# Patient Record
Sex: FEMALE | Race: BLACK OR AFRICAN AMERICAN | NOT HISPANIC OR LATINO | Employment: PART TIME | URBAN - METROPOLITAN AREA
[De-identification: names, ages, dates, MRNs, and addresses within clinical notes are randomized per-mention and may not be internally consistent; named-entity substitution may affect disease eponyms.]

---

## 2022-12-07 ENCOUNTER — PRE-ADMISSION TESTING (OUTPATIENT)
Dept: ADMISSIONS | Facility: MEDICAL CENTER | Age: 22
End: 2022-12-07
Attending: SPECIALIST
Payer: COMMERCIAL

## 2022-12-07 RX ORDER — ALPRAZOLAM 0.5 MG/1
0.5 TABLET ORAL NIGHTLY PRN
COMMUNITY

## 2022-12-08 ENCOUNTER — ANESTHESIA EVENT (OUTPATIENT)
Dept: SURGERY | Facility: MEDICAL CENTER | Age: 22
End: 2022-12-08
Payer: COMMERCIAL

## 2022-12-09 ENCOUNTER — ANESTHESIA (OUTPATIENT)
Dept: SURGERY | Facility: MEDICAL CENTER | Age: 22
End: 2022-12-09
Payer: COMMERCIAL

## 2022-12-09 ENCOUNTER — HOSPITAL ENCOUNTER (OUTPATIENT)
Facility: MEDICAL CENTER | Age: 22
End: 2022-12-09
Attending: SPECIALIST | Admitting: SPECIALIST
Payer: COMMERCIAL

## 2022-12-09 VITALS
TEMPERATURE: 98.1 F | HEART RATE: 77 BPM | SYSTOLIC BLOOD PRESSURE: 135 MMHG | RESPIRATION RATE: 20 BRPM | OXYGEN SATURATION: 96 % | WEIGHT: 247.58 LBS | DIASTOLIC BLOOD PRESSURE: 69 MMHG | BODY MASS INDEX: 36.67 KG/M2 | HEIGHT: 69 IN

## 2022-12-09 DIAGNOSIS — G89.18 POST-OP PAIN: ICD-10-CM

## 2022-12-09 LAB — HCG SERPL QL: NEGATIVE

## 2022-12-09 PROCEDURE — 00840 ANES IPER PX LOWER ABD NOS: CPT | Performed by: STUDENT IN AN ORGANIZED HEALTH CARE EDUCATION/TRAINING PROGRAM

## 2022-12-09 PROCEDURE — 700102 HCHG RX REV CODE 250 W/ 637 OVERRIDE(OP): Performed by: STUDENT IN AN ORGANIZED HEALTH CARE EDUCATION/TRAINING PROGRAM

## 2022-12-09 PROCEDURE — 160035 HCHG PACU - 1ST 60 MINS PHASE I: Performed by: SPECIALIST

## 2022-12-09 PROCEDURE — 160039 HCHG SURGERY MINUTES - EA ADDL 1 MIN LEVEL 3: Performed by: SPECIALIST

## 2022-12-09 PROCEDURE — 700105 HCHG RX REV CODE 258: Performed by: SPECIALIST

## 2022-12-09 PROCEDURE — 700111 HCHG RX REV CODE 636 W/ 250 OVERRIDE (IP): Performed by: STUDENT IN AN ORGANIZED HEALTH CARE EDUCATION/TRAINING PROGRAM

## 2022-12-09 PROCEDURE — 700111 HCHG RX REV CODE 636 W/ 250 OVERRIDE (IP): Performed by: SPECIALIST

## 2022-12-09 PROCEDURE — 160046 HCHG PACU - 1ST 60 MINS PHASE II: Performed by: SPECIALIST

## 2022-12-09 PROCEDURE — 700101 HCHG RX REV CODE 250: Performed by: STUDENT IN AN ORGANIZED HEALTH CARE EDUCATION/TRAINING PROGRAM

## 2022-12-09 PROCEDURE — 160002 HCHG RECOVERY MINUTES (STAT): Performed by: SPECIALIST

## 2022-12-09 PROCEDURE — 84703 CHORIONIC GONADOTROPIN ASSAY: CPT

## 2022-12-09 PROCEDURE — 36415 COLL VENOUS BLD VENIPUNCTURE: CPT

## 2022-12-09 PROCEDURE — A9270 NON-COVERED ITEM OR SERVICE: HCPCS | Performed by: STUDENT IN AN ORGANIZED HEALTH CARE EDUCATION/TRAINING PROGRAM

## 2022-12-09 PROCEDURE — 160009 HCHG ANES TIME/MIN: Performed by: SPECIALIST

## 2022-12-09 PROCEDURE — 93005 ELECTROCARDIOGRAM TRACING: CPT | Performed by: SPECIALIST

## 2022-12-09 PROCEDURE — 160048 HCHG OR STATISTICAL LEVEL 1-5: Performed by: SPECIALIST

## 2022-12-09 PROCEDURE — 88307 TISSUE EXAM BY PATHOLOGIST: CPT

## 2022-12-09 PROCEDURE — 160025 RECOVERY II MINUTES (STATS): Performed by: SPECIALIST

## 2022-12-09 PROCEDURE — 160028 HCHG SURGERY MINUTES - 1ST 30 MINS LEVEL 3: Performed by: SPECIALIST

## 2022-12-09 RX ORDER — SODIUM CHLORIDE, SODIUM LACTATE, POTASSIUM CHLORIDE, CALCIUM CHLORIDE 600; 310; 30; 20 MG/100ML; MG/100ML; MG/100ML; MG/100ML
INJECTION, SOLUTION INTRAVENOUS CONTINUOUS
Status: DISCONTINUED | OUTPATIENT
Start: 2022-12-09 | End: 2022-12-09 | Stop reason: HOSPADM

## 2022-12-09 RX ORDER — DEXAMETHASONE SODIUM PHOSPHATE 4 MG/ML
INJECTION, SOLUTION INTRA-ARTICULAR; INTRALESIONAL; INTRAMUSCULAR; INTRAVENOUS; SOFT TISSUE PRN
Status: DISCONTINUED | OUTPATIENT
Start: 2022-12-09 | End: 2022-12-09 | Stop reason: SURG

## 2022-12-09 RX ORDER — HYDROMORPHONE HYDROCHLORIDE 1 MG/ML
0.2 INJECTION, SOLUTION INTRAMUSCULAR; INTRAVENOUS; SUBCUTANEOUS
Status: DISCONTINUED | OUTPATIENT
Start: 2022-12-09 | End: 2022-12-09 | Stop reason: HOSPADM

## 2022-12-09 RX ORDER — MIDAZOLAM HYDROCHLORIDE 1 MG/ML
INJECTION INTRAMUSCULAR; INTRAVENOUS PRN
Status: DISCONTINUED | OUTPATIENT
Start: 2022-12-09 | End: 2022-12-09 | Stop reason: SURG

## 2022-12-09 RX ORDER — LIDOCAINE HYDROCHLORIDE 20 MG/ML
INJECTION, SOLUTION EPIDURAL; INFILTRATION; INTRACAUDAL; PERINEURAL PRN
Status: DISCONTINUED | OUTPATIENT
Start: 2022-12-09 | End: 2022-12-09 | Stop reason: SURG

## 2022-12-09 RX ORDER — ROCURONIUM BROMIDE 10 MG/ML
INJECTION, SOLUTION INTRAVENOUS PRN
Status: DISCONTINUED | OUTPATIENT
Start: 2022-12-09 | End: 2022-12-09 | Stop reason: SURG

## 2022-12-09 RX ORDER — HALOPERIDOL 5 MG/ML
1 INJECTION INTRAMUSCULAR
Status: DISCONTINUED | OUTPATIENT
Start: 2022-12-09 | End: 2022-12-09 | Stop reason: HOSPADM

## 2022-12-09 RX ORDER — CEFAZOLIN SODIUM 1 G/3ML
INJECTION, POWDER, FOR SOLUTION INTRAMUSCULAR; INTRAVENOUS PRN
Status: DISCONTINUED | OUTPATIENT
Start: 2022-12-09 | End: 2022-12-09 | Stop reason: SURG

## 2022-12-09 RX ORDER — OXYCODONE HCL 5 MG/5 ML
10 SOLUTION, ORAL ORAL
Status: COMPLETED | OUTPATIENT
Start: 2022-12-09 | End: 2022-12-09

## 2022-12-09 RX ORDER — IBUPROFEN 800 MG/1
800 TABLET ORAL EVERY 8 HOURS PRN
Qty: 30 TABLET | Refills: 0 | Status: SHIPPED | OUTPATIENT
Start: 2022-12-09

## 2022-12-09 RX ORDER — DIPHENHYDRAMINE HYDROCHLORIDE 50 MG/ML
12.5 INJECTION INTRAMUSCULAR; INTRAVENOUS
Status: DISCONTINUED | OUTPATIENT
Start: 2022-12-09 | End: 2022-12-09 | Stop reason: HOSPADM

## 2022-12-09 RX ORDER — HYDROMORPHONE HYDROCHLORIDE 1 MG/ML
0.1 INJECTION, SOLUTION INTRAMUSCULAR; INTRAVENOUS; SUBCUTANEOUS
Status: DISCONTINUED | OUTPATIENT
Start: 2022-12-09 | End: 2022-12-09 | Stop reason: HOSPADM

## 2022-12-09 RX ORDER — HYDROMORPHONE HYDROCHLORIDE 2 MG/ML
INJECTION, SOLUTION INTRAMUSCULAR; INTRAVENOUS; SUBCUTANEOUS PRN
Status: DISCONTINUED | OUTPATIENT
Start: 2022-12-09 | End: 2022-12-09 | Stop reason: SURG

## 2022-12-09 RX ORDER — SODIUM CHLORIDE, SODIUM LACTATE, POTASSIUM CHLORIDE, CALCIUM CHLORIDE 600; 310; 30; 20 MG/100ML; MG/100ML; MG/100ML; MG/100ML
INJECTION, SOLUTION INTRAVENOUS CONTINUOUS
Status: ACTIVE | OUTPATIENT
Start: 2022-12-09 | End: 2022-12-09

## 2022-12-09 RX ORDER — HYDROMORPHONE HYDROCHLORIDE 1 MG/ML
0.4 INJECTION, SOLUTION INTRAMUSCULAR; INTRAVENOUS; SUBCUTANEOUS
Status: DISCONTINUED | OUTPATIENT
Start: 2022-12-09 | End: 2022-12-09 | Stop reason: HOSPADM

## 2022-12-09 RX ORDER — ONDANSETRON 2 MG/ML
4 INJECTION INTRAMUSCULAR; INTRAVENOUS
Status: DISCONTINUED | OUTPATIENT
Start: 2022-12-09 | End: 2022-12-09 | Stop reason: HOSPADM

## 2022-12-09 RX ORDER — MEPERIDINE HYDROCHLORIDE 25 MG/ML
12.5 INJECTION INTRAMUSCULAR; INTRAVENOUS; SUBCUTANEOUS
Status: DISCONTINUED | OUTPATIENT
Start: 2022-12-09 | End: 2022-12-09 | Stop reason: HOSPADM

## 2022-12-09 RX ORDER — OXYCODONE HCL 5 MG/5 ML
5 SOLUTION, ORAL ORAL
Status: COMPLETED | OUTPATIENT
Start: 2022-12-09 | End: 2022-12-09

## 2022-12-09 RX ORDER — OXYCODONE HYDROCHLORIDE AND ACETAMINOPHEN 5; 325 MG/1; MG/1
1 TABLET ORAL EVERY 6 HOURS PRN
Qty: 28 TABLET | Refills: 0 | Status: SHIPPED | OUTPATIENT
Start: 2022-12-09 | End: 2022-12-16

## 2022-12-09 RX ADMIN — MIDAZOLAM HYDROCHLORIDE 2 MG: 1 INJECTION, SOLUTION INTRAMUSCULAR; INTRAVENOUS at 16:22

## 2022-12-09 RX ADMIN — PROPOFOL 200 MG: 10 INJECTION, EMULSION INTRAVENOUS at 16:31

## 2022-12-09 RX ADMIN — HYDROMORPHONE HYDROCHLORIDE 0.2 MCG: 2 INJECTION INTRAMUSCULAR; INTRAVENOUS; SUBCUTANEOUS at 16:53

## 2022-12-09 RX ADMIN — OXYCODONE HYDROCHLORIDE 10 MG: 5 SOLUTION ORAL at 19:57

## 2022-12-09 RX ADMIN — SODIUM CHLORIDE, POTASSIUM CHLORIDE, SODIUM LACTATE AND CALCIUM CHLORIDE: 600; 310; 30; 20 INJECTION, SOLUTION INTRAVENOUS at 14:37

## 2022-12-09 RX ADMIN — ROCURONIUM BROMIDE 5 MG: 10 INJECTION, SOLUTION INTRAVENOUS at 18:27

## 2022-12-09 RX ADMIN — ROCURONIUM BROMIDE 50 MG: 10 INJECTION, SOLUTION INTRAVENOUS at 16:32

## 2022-12-09 RX ADMIN — ROCURONIUM BROMIDE 10 MG: 10 INJECTION, SOLUTION INTRAVENOUS at 17:12

## 2022-12-09 RX ADMIN — ROCURONIUM BROMIDE 5 MG: 10 INJECTION, SOLUTION INTRAVENOUS at 18:59

## 2022-12-09 RX ADMIN — DEXAMETHASONE SODIUM PHOSPHATE 4 MG: 4 INJECTION, SOLUTION INTRA-ARTICULAR; INTRALESIONAL; INTRAMUSCULAR; INTRAVENOUS; SOFT TISSUE at 16:41

## 2022-12-09 RX ADMIN — CEFAZOLIN 2 G: 330 INJECTION, POWDER, FOR SOLUTION INTRAMUSCULAR; INTRAVENOUS at 16:38

## 2022-12-09 RX ADMIN — ROCURONIUM BROMIDE 10 MG: 10 INJECTION, SOLUTION INTRAVENOUS at 18:03

## 2022-12-09 RX ADMIN — LIDOCAINE HYDROCHLORIDE 80 MG: 20 INJECTION, SOLUTION EPIDURAL; INFILTRATION; INTRACAUDAL at 16:31

## 2022-12-09 RX ADMIN — HYDROMORPHONE HYDROCHLORIDE 0.2 MCG: 2 INJECTION INTRAMUSCULAR; INTRAVENOUS; SUBCUTANEOUS at 17:27

## 2022-12-09 RX ADMIN — ROCURONIUM BROMIDE 10 MG: 10 INJECTION, SOLUTION INTRAVENOUS at 17:34

## 2022-12-09 RX ADMIN — FENTANYL CITRATE 100 MCG: 50 INJECTION, SOLUTION INTRAMUSCULAR; INTRAVENOUS at 16:31

## 2022-12-09 ASSESSMENT — PAIN DESCRIPTION - PAIN TYPE
TYPE: SURGICAL PAIN;ACUTE PAIN
TYPE: SURGICAL PAIN
TYPE: SURGICAL PAIN;ACUTE PAIN

## 2022-12-09 NOTE — ANESTHESIA PREPROCEDURE EVALUATION
Case: [id number] Date/Time: 12/09/22 1445    Procedures:       LAPAROSCOPY, DIAGNOSTIC AND OPERATIVE WITH LAPAROSCOPIC REMOVAL OF RIGHT OVARIAN CYST AND ALSO POSSIBLE CHROMOPERTUBATION      EXCISION, CYST, OVARY      CHROMOPERTUBATION,FALLOPIAN TUBE    Pre-op diagnosis: PELVIC PAIN, DYSMENORRHEA, RIGHT OVARIAN CYST    Location: [facility] ROOM 22 / SURGERY SAME DAY [facility]    Surgeons: Fady Zhong M.D.        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  Her memory is that her last ECHO about 2 years ago showed that her heart function had mostly recovered.  Her cardiologist told her that her heart function is good enough for surgery.  Preop ECG is normal.  >4 METS    Relevant Problems   No relevant active problems       Physical Exam    Airway   Mallampati: III  TM distance: >3 FB  Neck ROM: full       Cardiovascular - normal exam  Rhythm: regular  Rate: normal  (-) murmur     Dental - normal exam           Pulmonary - normal exam  Breath sounds clear to auscultation     Abdominal    Neurological - normal exam                 Anesthesia Plan    ASA 3   ASA physical status 3 criteria: other (comment)    Plan - general       Airway plan will be ETT          Induction: intravenous    Postoperative Plan: Postoperative administration of opioids is intended.    Pertinent diagnostic labs and testing reviewed    Informed Consent:    Anesthetic plan and risks discussed with patient.    Use of blood products discussed with: patient whom consented to blood products.

## 2022-12-09 NOTE — H&P
Cristel Joseph       YOB: 2000  Date of today's procedure: Friday, December 9, 2022  Facility: [facility]    ID: The patient is a very pleasant 22-year-old nullipara.    Chief Complaint: The patient complains of pelvic pain.    History of Present Illness: The patient has been experiencing complains of pelvic pain and also dysmenorrhea.  She denies menorrhagia.  She is scheduled to have laparoscopy, both diagnostic and operative along with laparoscopic chromopertubation and also incision and drainage of right ovarian cyst..    Past Medical History: The patient says she has no medical illnesses.    Past Surgical History: The patient has had no previous surgeries or procedures other than tooth extraction.    Medications: The patient currently takes no medications.    Allergies: The patient says she has no known drug allergies.    Social History: The patient smokes.  She consumes 3-5 alcoholic beverages per week.  She uses marijuana 1-3 times per week.    Review of Systems  General: The patient denies any fevers, chills, sweats.  Pulmonary: The patient denies any coughing, wheezing, chest pain, shortness of breath.  Cardiovascular: The patient denies any palpitations, dyspnea, chest pain.  Gastrointestinal: The patient denies any nausea, vomiting, diarrhea, constipation, hematochezia, melena, history of hepatitis, history of jaundice.  Genitourinary: The patient complains of pelvic pain.  She complains of dysmenorrhea.  She does not report menorrhagia  Musculoskeletal: The patient denies any arthralgias or myalgias.   Neurological: No headaches or syncope or seizures.     Physical Exam:   Vital Signs: The patient's vital signs are stable and she is afebrile.  General: The patient appears well developed and well nourished and relaxed and alert and comfortable and in no apparent distress.    HEENT :  Normo-cephalic, atraumatic, pupils equal, round, reactive to light and accommodation,  extra ocular motions intact, pharynx clear; there is no thyromegaly. There is no cervical lymphadenopathy.  Chest: Heart regular rate and rhythm, with no murmurs or rubs or gallops; the lungs are clear to auscultation bilaterally.  Abdomen: Examination of the patient's abdomen with the patient in the dorsal supine position reveals that the abdomen is soft and nontender and nondistended and that there is no evidence of hepatomegaly and that there is no evidence of splenomegaly and that there is no evidence of any abdominal masses.  Extremities: No clubbing or cyanosis or edema.   Neurological: non-focal.     Assessment:   Dysmenorrhea.  Pelvic pain.  Recent transvaginal pelvic ultrasound performed by myself in the office reveals what appears to be a right-sided ovarian endometrioma measuring approximately 5.59 cm x 5.14 cm x 4.21 cm.  It is likely that the patient has endometriosis given this finding on ultrasound and her symptoms of dysmenorrhea and pelvic pain.    Plan:   We we will proceed today with laparoscopy, diagnostic and operative, with laparoscopic chromopertubation and laparoscopic incision and drainage of right ovarian cyst.  I have discussed with the patient and explained to the patient in detail and at length what diagnostic and operative laparoscopy, including laparoscopic chromopertubation and including laparoscopic incision and drainage of right ovarian cyst is and what diagnostic and operative laparoscopy including laparoscopic chromopertubation and including incision and drainage of right ovarian cyst involved and I have discussed with the patient and explained to the patient in detail and at length the risks and benefits and alternatives of diagnostic and operative laparoscopy including laparoscopic chromopertubation and including laparoscopic incision and drainage of right ovarian cyst.  After our discussions and after answering her question she told me that she very much wishes for us to  proceed with laparoscopy, both diagnostic and if necessary and depending on findings at the time of laparoscopy, operative, along with laparoscopic chromopertubation and along with laparoscopic incision and drainage of right ovarian cyst.            ________________________  Fady Zhong M.D.

## 2022-12-10 LAB — EKG IMPRESSION: NORMAL

## 2022-12-10 PROCEDURE — 93010 ELECTROCARDIOGRAM REPORT: CPT | Performed by: INTERNAL MEDICINE

## 2022-12-10 NOTE — OR SURGEON
Immediate Post OP Note    PreOp Diagnosis:   Dysmenorrhea.  Pelvic pain.  Recent transvaginal pelvic ultrasound performed by myself in the office reveals what appears to be a right-sided ovarian endometrioma measuring approximately 5.59 cm x 5.14 cm x 4.21 cm.  It is likely that the patient has endometriosis given this finding on ultrasound and her symptoms of dysmenorrhea and pelvic pain.    PostOp Diagnosis:   Dysmenorrhea.  Pelvic pain.  Right ovarian dermoid cyst.     Procedure(s):  LAPAROSCOPY, DIAGNOSTIC AND OPERATIVE WITH LAPAROSCOPIC REMOVAL OF RIGHT OVARIAN CYST - Wound Class: Clean Contaminated  EXCISION, CYST, OVARY - Wound Class: Clean  CHROMOPERTUBATION,FALLOPIAN TUBE - Wound Class: Clean Contaminated    Surgeon(s):  Fady Zhong M.D.    Anesthesiologist/Type of Anesthesia:  Anesthesiologist: Edgard Martinez M.D./General    Surgical Staff:  Circulator: Anita A Reyes, R.N.; Ramses Gusman R.N.  Relief Circulator: Sunshine Patterson R.N.  Scrub Person: Mu Hendersons    Specimens removed if any:  ID Type Source Tests Collected by Time Destination   A : PORTION OF RIGHT OVARIAN DERMOID CYST Tissue Ovary PATHOLOGY SPECIMEN Fady Zhong M.D. 12/9/2022  6:18 PM        Estimated Blood Loss:   Less than 30 cc's.     Findings:   Speculum exam under anesthesia reveals no vulvar or vaginal or cervical lesions and no cervical or vaginal discharge.  The uterus was sounded to 7.5 cm.  During laparoscopy excellent views of the pelvis were obtained.  The uterus was normal.  The cul-de-sac was normal.  Both fallopian tubes were normal.  The left ovary was normal.  The right ovary contained a large cyst when inadvertently incised revealed fluid consistent with an ovarian dermoid cyst.  Also hair was found within this cyst.  Of note during laparoscopic chromopertubation bilateral fill and spill of blue dye was noted indicating that both fallopian tubes are patent.    Complications:   None.        12/9/2022 7:53 PM  Fady Zhong M.D.

## 2022-12-10 NOTE — ANESTHESIA PROCEDURE NOTES
Airway    Date/Time: 12/9/2022 4:35 PM  Performed by: Edgard Martinez M.D.  Authorized by: Edgard Martinez M.D.     Location:  OR  Urgency:  Elective  Difficult Airway: No    Indications for Airway Management:  Anesthesia      Spontaneous Ventilation: absent    Sedation Level:  Deep  Preoxygenated: Yes    Patient Position:  Sniffing  Mask Difficulty Assessment:  1 - vent by mask  Final Airway Type:  Endotracheal airway  Final Endotracheal Airway:  ETT  Cuffed: Yes    Technique Used for Successful ETT Placement:  Direct laryngoscopy  Devices/Methods Used in Placement:  Anterior pressure/BURP    Insertion Site:  Oral  Blade Type:  Angelina  Laryngoscope Blade/Videolaryngoscope Blade Size:  3  ETT Size (mm):  6.5  Measured from:  Teeth  ETT to Teeth (cm):  21  Placement Verified by: auscultation and capnometry    Cormack-Lehane Classification:  Grade I - full view of glottis  Number of Attempts at Approach:  1

## 2022-12-10 NOTE — OR NURSING
1935 pm -  Arrived from OR, pt arousable, with oxygen at 6 liters/min via mask, VSS, lap sites x 3, CDI, no drainage noted. With bruner catheter in place - clear, yellow urine.    2030 - pt doing really well, Fully awake, A, O x 4, denies n/v, reported discomfort at pain scale 2-3/10.    - Transferred to the recliner - well tolerated, denies n/v, with very minimal pain reported.Lap sites x 3 CDI, bruner catheter removed as per order prior to transfer.    2100 Discharge instructions discussed with m other. All questions answered. Verbalized understanding. PIV removed with tip intact.     Pt ambulated from the recliner to the toilet, voided.  Denies n/v, and reported very minimal pain in the lap sites x 3, no drainage as reported by the pt.     2115Pt escorted out of department in wheelchair with all belongings. Discharged home to responsible adult.

## 2022-12-10 NOTE — DISCHARGE INSTRUCTIONS
If any questions arise, call your provider.  If your provider is not available, please feel free to call the Surgical Center at [phone].    MEDICATIONS: Resume taking daily medication.  Take prescribed pain medication with food.  If no medication is prescribed, you may take non-aspirin pain medication if needed.  PAIN MEDICATION CAN BE VERY CONSTIPATING.  Take a stool softener or laxative such as senokot, pericolace, or milk of magnesia if needed.    Last pain medication given at Oxycodone 10 mg solution at 7:58 pm    What to Expect Post Anesthesia    Rest and take it easy for the first 24 hours.  A responsible adult is recommended to remain with you during that time.  It is normal to feel sleepy.  We encourage you to not do anything that requires balance, judgment or coordination.    FOR 24 HOURS DO NOT:  Drive, operate machinery or run household appliances.  Drink beer or alcoholic beverages.  Make important decisions or sign legal documents.    To avoid nausea, slowly advance diet as tolerated, avoiding spicy or greasy foods for the first day.  Add more substantial food to your diet according to your provider's instructions.  INCREASE FLUIDS AND FIBER TO AVOID CONSTIPATION.    MILD FLU-LIKE SYMPTOMS ARE NORMAL.  YOU MAY EXPERIENCE GENERALIZED MUSCLE ACHES, THROAT IRRITATION, HEADACHE AND/OR SOME NAUSEA.

## 2022-12-10 NOTE — ANESTHESIA POSTPROCEDURE EVALUATION
Patient: Cristel Joseph    Procedure Summary     Date: 12/09/22 Room / Location: [facility] ROOM 22 / SURGERY SAME DAY [facility]    Anesthesia Start: 1625 Anesthesia Stop: 1941    Procedures:       LAPAROSCOPY, DIAGNOSTIC AND OPERATIVE WITH LAPAROSCOPIC REMOVAL OF RIGHT OVARIAN CYST (Abdomen)      EXCISION, CYST, OVARY (Right: Abdomen)      CHROMOPERTUBATION,FALLOPIAN TUBE (Uterus) Diagnosis: (PELVIC PAIN, DYSMENORRHEA, RIGHT OVARIAN DERMOID CYST)    Surgeons: Fady Zhong M.D. Responsible Provider: Edgard Martinez M.D.    Anesthesia Type: general ASA Status: 3          Final Anesthesia Type: general  Last vitals  BP   Blood Pressure: (!) 151/71    Temp   36.9 °C (98.5 °F)    Pulse   71   Resp   18    SpO2   100 %      Anesthesia Post Evaluation    Patient location during evaluation: PACU  Patient participation: complete - patient participated  Level of consciousness: awake and alert    Airway patency: patent  Anesthetic complications: no  Cardiovascular status: hemodynamically stable  Respiratory status: acceptable  Hydration status: euvolemic    PONV: none          No notable events documented.

## 2022-12-10 NOTE — OP REPORT
DATE OF SERVICE:  12/09/2022     PREOPERATIVE DIAGNOSES:  1.  Dysmenorrhea.  2.  Pelvic pain.  3.  Recent transvaginal pelvic ultrasound revealed a right-sided ovarian cyst,   which appeared to be most consistent with an ovarian endometrioma and   measured 5.59 cm x 5.14 cm x 4.21 cm and it was felt likely that the patient   has endometriosis given this finding on ultrasound and her symptoms of   dysmenorrhea and pelvic pain.     POSTOPERATIVE DIAGNOSES:  1.  Dysmenorrhea.  2.  Pelvic pain.  3.  Right ovarian dermoid cyst.  During laparoscopy, no evidence of   endometriosis was seen.     PROCEDURES:  Diagnostic and operative laparoscopy with laparoscopic   chromopertubation and laparoscopic removal of right ovarian dermoid cyst.     SURGEON:  Fady Zhong MD     ANESTHESIA:  General endotracheal tube.     ANESTHESIOLOGIST:  Edgard Martinez MD     FINDINGS:  Speculum exam under anesthesia reveals no vulvar or vaginal or   cervical lesions and no cervical or vaginal discharge.  The uterus was sounded   to 7.5 cm.     During laparoscopy, excellent views of the pelvis were obtained.  The uterus   is found to be normal.  Both fallopian tubes were found to be normal.  The   left ovary was normal.  The cul-de-sac was normal.  Bilateral fill and spill   of blue dye was seen during laparoscopic chromopertubation indicating that   both fallopian tubes are patent.  There was a right-sided ovarian cyst, which   when entered revealed fluid consistent with a dermoid cyst and also revealed   hair also consistent with a dermoid cyst.     SPECIMENS:  Right ovarian dermoid cyst.     COMPLICATIONS:  None.     ESTIMATED BLOOD LOSS:  Less than 30 mL.     DESCRIPTION OF PROCEDURE:  After appropriate consents have been obtained, the   patient was taken to the operating room and given general anesthesia.  She was   prepped and draped in the dorsal lithotomy position.  A Cody catheter was   placed and found to be draining urine.   Speculum exam was performed and   reveals no vulvar or vaginal or cervical lesions.  The cervix was well   visualized and appears somewhat nulliparous.  The anterior aspect of the   cervix was grasped with a single-tooth tenaculum.  The cervix was dilated with   Hanks dilators.  The uterus was sounded to 7.5 cm.  A 6-cm PURNIMA uterine   manipulator was inserted through the endocervical canal into the intrauterine   cavity and the balloon at the tip of the PURNIMA uterine manipulator was inflated   with a few mL of air.  The single-tooth tenaculum was removed from the   cervix.  Of note, a syringe (a 50-mL syringe) filled with saline mixed with   methylene blue is attached to one of the tubes attached to the PURNIMA uterine   manipulator.  The 's gloves were changed.  Attention was then directed   to the abdomen where a small (approximately 1-1.5 cm) horizontal   infraumbilical incision was made with a scalpel.  Veress needle was advanced   through this incision near to the peritoneal cavity and proper placement in   the peritoneal cavity was verified with Menchaca hanging drop technique.  The   peritoneal cavity was then insufflated with approximately 2-3 liters of carbon   dioxide gas.  The Veress needle was removed and a 5-mm port was introduced   through the infraumbilical incision into the peritoneal cavity utilizing the   VersaStep trocar system.  The central portion of this port was removed and the   5-mm 0-degree laparoscope was inserted through the remaining sleeve and   proper entry into the peritoneal cavity, verified visually with laparoscope.    The patient was placed in Trendelenburg position.  A 5-mm port was placed   suprapubically under direct laparoscopic visualization utilizing the VersaStep   trocar system.  Later, a 5-mm port was placed in left lower quadrant under   direct laparoscopic visualization utilizing the VersaStep trocar system.  The   patient was placed in Trendelenburg position and the  uterus was mobilized with   the uterine manipulator and findings are as noted above.  Laparoscopic   chromopertubation was performed and bilateral fill and spill of blue dye seen   indicating that both fallopian tubes are patent.  The right ovary is examined.    It should be noted that during this procedure, the suprapubic port was later   converted from a 5-mm port to a 11-mm port and that later further during this   procedure, the 5-mm infraumbilical port was converted to 11-mm infraumbilical   port and the 5-mm 0-degree laparoscope was later exchanged for the 10-mm   0-degree laparoscope.  Using a monopolar instrument, the surface of the right   ovarian cyst was cauterized and incised.  During this process, the cyst was   entered.  The Harmonic scalpel was used to try to dissect the ovarian tissue   away from the cyst, but the cyst was further entered.  Because the cyst was   further entered, it could not be  from the normal ovarian tissue.    However, using the Harmonic scalpel, the cyst was removed in portions and the   remaining ovarian tissue appeared to be normal ovarian tissue.  Two EndoCatch   bags were used to remove the 2 portions of ovarian endometrioma.  The pelvis   and right ovary were then very copiously irrigated and drained with several   liters of irrigation fluid using the An[employer] Software suction irrigation instrument.    After the pelvis was irrigated and drained several times, no more dermoid cyst   fluid was seen in the pelvis.  Hemostasis was noted to be excellent.  The   patient was taken out of Trendelenburg position.  Laparoscope was removed and   air was allowed to evacuate the peritoneal cavity and all ports were removed.    The fascia underneath the infraumbilical incision was identified with the use   of S retractors, reapproximated with placement of simple interrupted suture   using Vicryl.  The fascia underneath the suprapubic incision was identified   with use of S retractors,  reapproximated with placement of simple interrupted   suture using Vicryl.  Skin incisions were reapproximated with placement of   interrupted buried sutures of 4-0 Monocryl placed in the dermis and one set   suture placed for every incision.  The PURNIMA uterine manipulator and vaginal   gauze sponge were removed.  The procedure was terminated with final lap and   needle counts reported to be correct x2 at the end of the procedure.  The   patient tolerated the procedure well and sent to postanesthesia recovery in   stable condition.        ______________________________  Fady Zhong MD    MED/[facility]    DD:  12/09/2022 20:06  DT:  12/09/2022 21:35    Job#:  [id number]    CC:Edgard Martienz MD

## 2022-12-10 NOTE — ANESTHESIA TIME REPORT
Anesthesia Start and Stop Event Times     Date Time Event    12/9/2022 1615 Ready for Procedure     1625 Anesthesia Start     1941 Anesthesia Stop        Responsible Staff  12/09/22    Name Role Begin End    Edgard Martinez M.D. Anesth 1625 1941        Overtime Reason:  no overtime (within assigned shift)    Comments:

## 2022-12-10 NOTE — ANESTHESIA PROCEDURE NOTES
Peripheral IV  Performed by: Edgard Martinez M.D.  Authorized by: Edgard Martinez M.D.     Size:  18 G  Laterality:  Right  Peripheral IV Location:  Forearm  Technique:  Ultrasound guided  Attempts:  3  Difficult IV necessitating physician skill: IV access difficult    Ultrasound Guidance: Yes

## 2022-12-12 LAB — PATHOLOGY CONSULT NOTE: NORMAL

## (undated) DEVICE — PAD SANITARY 11IN MAXI IND WRAPPED  (12EA/PK 24PK/CA)

## (undated) DEVICE — UTERINE MANIP RUMI 6.7X6 - (5/BX)

## (undated) DEVICE — GOWN WARMING STANDARD FLEX - (30/CA)

## (undated) DEVICE — SET LEADWIRE 5 LEAD BEDSIDE DISPOSABLE ECG (1SET OF 5/EA)

## (undated) DEVICE — BANDAID X-LARGE 2 X 4 IN LF (50EA/BX)

## (undated) DEVICE — DRESSING TRANSPARENT FILM TEGADERM 2.375 X 2.75"  (100EA/BX)"

## (undated) DEVICE — BAG RETRIEVAL 10ML (10EA/BX)

## (undated) DEVICE — TUBING CLEARLINK DUO-VENT - C-FLO (48EA/CA)

## (undated) DEVICE — NEEDLE INSUFFLATION FOR STEP - (12/BX)

## (undated) DEVICE — WATER IRRIGATION STERILE 1000ML (12EA/CA)

## (undated) DEVICE — SUTURE 0 VICRYL PLUS UR-6 - 27 INCH (36/BX)

## (undated) DEVICE — PEN SKIN MARKER W/RULER - (50EA/BX)

## (undated) DEVICE — TUBE E-T HI-LO CUFF 6.5MM (10EA/BX)

## (undated) DEVICE — MASK OXYGEN VNYL ADLT MED CONC WITH 7 FOOT TUBING  - (50EA/CA)

## (undated) DEVICE — TROCAR STEP 11MM - (3/CA)

## (undated) DEVICE — KIT  I.V. START (100EA/CA)

## (undated) DEVICE — SUTURE GENERAL

## (undated) DEVICE — CANISTER SUCTION 3000ML MECHANICAL FILTER AUTO SHUTOFF MEDI-VAC NONSTERILE LF DISP  (40EA/CA)

## (undated) DEVICE — SET SUCTION/IRRIGATION WITH DISPOSABLE TIP (6/CA )PART #0250-070-520 IS A SUB

## (undated) DEVICE — TUBE CONNECTING SUCTION - CLEAR PLASTIC STERILE 72 IN (50EA/CA)

## (undated) DEVICE — SUTURE 4-0 MONOCRYL PLUS PS-2 - 27 INCH (36/BX)

## (undated) DEVICE — CANISTER SUCTION RIGID RED 1500CC (40EA/CA)

## (undated) DEVICE — CANNULA W/ SUPPLY TUBING O2 - (50/CA)

## (undated) DEVICE — TOWEL STOP TIMEOUT SAFETY FLAG (40EA/CA)

## (undated) DEVICE — SPONGE GAUZESTER. 2X2 4-PL - (2/PK 50PK/BX 30BX/CS)

## (undated) DEVICE — SODIUM CHL IRRIGATION 0.9% 1000ML (12EA/CA)

## (undated) DEVICE — ELECTRODE 5MM LHK LAPSCP STERILE DISP- MEGADYNE  (5/CA)

## (undated) DEVICE — BANDAID SHEER STRIP 3/4 IN (100EA/BX 12BX/CA)

## (undated) DEVICE — Device

## (undated) DEVICE — GLOVE BIOGEL SZ 7.5 SURGICAL PF LTX - (50PR/BX 4BX/CA)

## (undated) DEVICE — TRAY FOLEY CATHETER STATLOCK 16FR SURESTEP  (10EA/CA)

## (undated) DEVICE — PENCIL ELECTSURG 10FT BTN SWH - (50/CA)

## (undated) DEVICE — SUCTION INSTRUMENT YANKAUER BULBOUS TIP W/O VENT (50EA/CA)

## (undated) DEVICE — DERMABOND ADVANCED - (12EA/BX)

## (undated) DEVICE — SENSOR OXIMETER ADULT SPO2 RD SET (20EA/BX)

## (undated) DEVICE — LACTATED RINGERS INJ 1000 ML - (14EA/CA 60CA/PF)

## (undated) DEVICE — SEALER VESSEL HARMONIC ACE PLUS WITH ADVANCED HEMOSTASIS 36CM (1/EA)

## (undated) DEVICE — SLEEVE VASO CALF MED - (10PR/CA)

## (undated) DEVICE — SYRINGE NON SAFETY 3 CC 21 GA X 1 1/2 IN (100/BX 8BX/CA)